# Patient Record
Sex: MALE | Race: WHITE
[De-identification: names, ages, dates, MRNs, and addresses within clinical notes are randomized per-mention and may not be internally consistent; named-entity substitution may affect disease eponyms.]

---

## 2017-07-26 ENCOUNTER — HOSPITAL ENCOUNTER (EMERGENCY)
Dept: HOSPITAL 25 - UCCORT | Age: 37
Discharge: HOME | End: 2017-07-26
Payer: COMMERCIAL

## 2017-07-26 VITALS — DIASTOLIC BLOOD PRESSURE: 80 MMHG | SYSTOLIC BLOOD PRESSURE: 125 MMHG

## 2017-07-26 DIAGNOSIS — F17.210: ICD-10-CM

## 2017-07-26 DIAGNOSIS — R50.9: Primary | ICD-10-CM

## 2017-07-26 LAB
ADD DIFF/SLIDE REVIEW?: (no result)
ALBUMIN SERPL BCG-MCNC: 4.2 G/DL (ref 3.2–5.2)
ALP SERPL-CCNC: 69 U/L (ref 34–104)
ALT SERPL W P-5'-P-CCNC: 19 U/L (ref 7–52)
ANION GAP SERPL CALC-SCNC: 6 MMOL/L (ref 2–11)
AST SERPL-CCNC: 18 U/L (ref 13–39)
BUN SERPL-MCNC: 10 MG/DL (ref 6–24)
BUN/CREAT SERPL: 11 (ref 8–20)
CALCIUM SERPL-MCNC: 9 MG/DL (ref 8.6–10.3)
CHLORIDE SERPL-SCNC: 102 MMOL/L (ref 101–111)
GLOBULIN SER CALC-MCNC: 2.8 G/DL (ref 2–4)
GLUCOSE SERPL-MCNC: 84 MG/DL (ref 70–100)
HCO3 SERPL-SCNC: 27 MMOL/L (ref 22–32)
HCT VFR BLD AUTO: 48 % (ref 42–52)
HGB BLD-MCNC: 15.8 G/DL (ref 14–18)
LIPASE SERPL-CCNC: 26 U/L (ref 11–82)
MCH RBC QN AUTO: 32 PG (ref 27–31)
MCHC RBC AUTO-ENTMCNC: 33 G/DL (ref 31–36)
MCV RBC AUTO: 97 FL (ref 80–94)
POTASSIUM SERPL-SCNC: 4.5 MMOL/L (ref 3.5–5)
PROT SERPL-MCNC: 7 G/DL (ref 6.4–8.9)
RBC # BLD AUTO: 4.98 10^6/UL (ref 4–5.4)
SODIUM SERPL-SCNC: 135 MMOL/L (ref 133–145)
WBC # BLD AUTO: 10.9 10^3/UL (ref 3.5–10.8)

## 2017-07-26 PROCEDURE — G0463 HOSPITAL OUTPT CLINIC VISIT: HCPCS

## 2017-07-26 PROCEDURE — 80053 COMPREHEN METABOLIC PANEL: CPT

## 2017-07-26 PROCEDURE — 36415 COLL VENOUS BLD VENIPUNCTURE: CPT

## 2017-07-26 PROCEDURE — 83690 ASSAY OF LIPASE: CPT

## 2017-07-26 PROCEDURE — 85025 COMPLETE CBC W/AUTO DIFF WBC: CPT

## 2017-07-26 PROCEDURE — 99211 OFF/OP EST MAY X REQ PHY/QHP: CPT

## 2017-07-26 NOTE — ED
HPI Febrile Illness





- HPI Summary


HPI Summary: 





37 YEAR OLD MALE PRESENTS WITH COMPLAINS OF ON GOING FEVER/CHILLS. 





- History of Current Complaint


Time Seen by Provider: 07/26/17 15:07





- Allergy/Home Medications


Allergies/Adverse Reactions: 


 Allergies











Allergy/AdvReac Type Severity Reaction Status Date / Time


 


No Known Allergies Allergy   Verified 07/26/17 15:10











Home Medications: 


 Home Medications





NK [No Home Medications Reported]  07/26/17 [History Confirmed 07/26/17]











PMH/Surg Hx/FS Hx/Imm Hx





- Social History


Alcohol Use: None


Substance Use Type: Reports: None


Smoking Status (MU): Heavy Every Day Tobacco Smoker


Type: Cigarettes


Amount Used/How Often: 2 PPD


Have You Smoked in the Last Year: Yes





Review of Systems


Positive: Fever, Chills


All Other Systems Reviewed And Are Negative: Yes





Physical Exam


Triage Information Reviewed: Yes





Diagnostics





- Laboratory


Result Diagrams: 


 07/26/17 15:30





 07/26/17 15:30


Lab Statement: Any lab studies that have been ordered have been reviewed, and 

results considered in the medical decision making process.





Course/Dx





- Diagnoses


Provider Diagnoses: 


 Fever








Discharge





- Discharge Plan


Condition: Stable


Disposition: HOME


Patient Education Materials:  Fever in Adults (ED)


Referrals: 


No Primary Care Phys,NOPCP [Primary Care Provider] - As Soon As Possible

## 2017-12-29 ENCOUNTER — HOSPITAL ENCOUNTER (EMERGENCY)
Dept: HOSPITAL 25 - UCCORT | Age: 37
Discharge: HOME | End: 2017-12-29
Payer: COMMERCIAL

## 2017-12-29 VITALS — SYSTOLIC BLOOD PRESSURE: 117 MMHG | DIASTOLIC BLOOD PRESSURE: 70 MMHG

## 2017-12-29 DIAGNOSIS — F17.210: ICD-10-CM

## 2017-12-29 DIAGNOSIS — J40: Primary | ICD-10-CM

## 2017-12-29 PROCEDURE — 99212 OFFICE O/P EST SF 10 MIN: CPT

## 2017-12-29 PROCEDURE — G0463 HOSPITAL OUTPT CLINIC VISIT: HCPCS

## 2017-12-29 NOTE — UC
Respiratory Complaint HPI





- HPI Summary


HPI Summary: 





3 weeks of cough and chest congestion no fever





- History of Current Complaint


Chief Complaint: UCRespiratory


Stated Complaint: CHEST CONGESTION,COUGH


Time Seen by Provider: 12/29/17 17:23


Hx Obtained From: Patient


Onset/Duration: Sudden Onset, Lasting Weeks - 3, Still Present


Timing: Constant


Severity Initially: Mild


Severity Currently: Mild


Character: Cough: Nonproductive


Aggravating Factors: Nothing


Alleviating Factors: Nothing


Associated Signs And Symptoms: Positive: URI





- Allergies/Home Medications


Allergies/Adverse Reactions: 


 Allergies











Allergy/AdvReac Type Severity Reaction Status Date / Time


 


No Known Allergies Allergy   Verified 12/29/17 17:19














PMH/Surg Hx/FS Hx/Imm Hx


Previously Healthy: Yes





- Surgical History


Surgical History: None





- Family History


Known Family History: Positive: None





- Social History


Occupation: Employed Full-time


Lives: With Family


Alcohol Use: None


Substance Use Type: None


Smoking Status (MU): Heavy Every Day Tobacco Smoker


Type: Cigarettes


Amount Used/How Often: 2 PPD


Have You Smoked in the Last Year: Yes


Household Exposure Type: Cigarettes


Cessation Counseling: Counseled 3+Min - 10 Min





- Immunization History


Most Recent Influenza Vaccination: no





Review of Systems


Constitutional: Negative


Skin: Negative


Eyes: Negative


ENT: Negative


Respiratory: Cough


Cardiovascular: Negative


Gastrointestinal: Negative


Genitourinary: Negative


Motor: Negative


Neurovascular: Negative


Musculoskeletal: Negative


Neurological: Negative


Psychological: Negative


Is Patient Immunocompromised?: No


All Other Systems Reviewed And Are Negative: Yes





Physical Exam


Triage Information Reviewed: Yes


Appearance: Well-Appearing, No Pain Distress, Well-Nourished


Vital Signs: 


 Initial Vital Signs











Temp  98.4 F   12/29/17 17:19


 


Pulse  90   12/29/17 17:19


 


Resp  20   12/29/17 17:19


 


BP  117/70   12/29/17 17:19


 


Pulse Ox  99   12/29/17 17:19











Vital Signs Reviewed: Yes


Eye Exam: Normal


Eyes: Positive: Conjunctiva Clear


ENT Exam: Normal


ENT: Positive: Normal ENT inspection, Hearing grossly normal, Pharynx normal, 

TMs normal, Uvula midline.  Negative: Nasal congestion, Nasal drainage, 

Tonsillar swelling, Tonsillar exudate, Trismus, Hoarse voice, Dental tenderness

, Sinus tenderness


Dental Exam: Normal


Neck exam: Normal


Neck: Positive: Supple, Nontender, No Lymphadenopathy


Respiratory Exam: Normal


Respiratory: Positive: Chest non-tender, Lungs clear, Normal breath sounds, No 

respiratory distress, No accessory muscle use


Cardiovascular Exam: Normal


Cardiovascular: Positive: RRR, No Murmur, Pulses Normal, Brisk Capillary Refill


Musculoskeletal Exam: Normal


Musculoskeletal: Positive: Strength Intact, ROM Intact, No Edema


Neurological Exam: Normal


Neurological: Positive: Alert, Muscle Tone Normal


Psychological Exam: Normal


Skin Exam: Normal





UC Diagnostic Evaluation





- Laboratory


O2 Sat by Pulse Oximetry: 99





Respiratory Course/Dx





- Course


Course Of Treatment: Zithromax, increase fluids, nicotine cesasation 

information follow with pcp





- Differential Dx/Diagnosis


Provider Diagnoses: Bronchitis, nicotine dependent





Discharge





- Discharge Plan


Condition: Stable


Disposition: HOME


Prescriptions: 


Azithromycin TAB* [Zithromax TAB (Z-DERIC) 250 mg #6 tabs] 2 tab PO .TODAY, THEN 

1 DAILY #1 deric


Patient Education Materials:  How to Stop Smoking (ED), Acute Bronchitis (ED)


Referrals: 


[facility] [place] PHYSICIAN REFERRAL [Outside] - 1 Week

## 2018-11-06 ENCOUNTER — HOSPITAL ENCOUNTER (EMERGENCY)
Dept: HOSPITAL 25 - UCCORT | Age: 38
Discharge: HOME | End: 2018-11-06
Payer: COMMERCIAL

## 2018-11-06 VITALS — DIASTOLIC BLOOD PRESSURE: 83 MMHG | SYSTOLIC BLOOD PRESSURE: 134 MMHG

## 2018-11-06 DIAGNOSIS — F17.210: ICD-10-CM

## 2018-11-06 DIAGNOSIS — S39.012A: Primary | ICD-10-CM
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
## 2018-11-06 PROCEDURE — 99212 OFFICE O/P EST SF 10 MIN: CPT

## 2018-11-06 PROCEDURE — G0463 HOSPITAL OUTPT CLINIC VISIT: HCPCS

## 2018-11-06 PROCEDURE — 72100 X-RAY EXAM L-S SPINE 2/3 VWS: CPT

## 2018-11-06 NOTE — UC
Back Pain HPI





- HPI Summary


HPI Summary: 





right lower back pain x 3 days


pain is sever, shooting pain , radiating to his right lower ext, 


+ tingling and numbness of right lower leg 


pain is worse with any movement , better with rest


pain started 3 days ago at work after a sneez 


no urinary sx 





- History of Current Complaint


Chief Complaint: UCBackPain


Stated Complaint: WC-LOWER BACK PAIN


Time Seen by Provider: 11/06/18 08:16


Hx Obtained From: Patient


Onset/Duration: Sudden Onset, Lasting Days - 3, Still Present


Timing: Constant


Severity Initially: Severe


Severity Currently: Severe


Pain Intensity: 10


Back Pain: Is Discrete @ - right lower back


Character: Sharp, Throbbing


Aggravating Factor(s): Movement, Lifting, Bending, Walking, Cough


Alleviating Factor(s): Rest


Associated Signs And Symptoms: Positive: Weakness, Numbness, Tingling.  Negative

: Swelling, Redness, Bruising, Fever, Abdominal Pain, Flank Pain, Bladder 

Incontinence, Bowel Incontinence, Weight Loss





- Allergies/Home Medications


Allergies/Adverse Reactions: 


 Allergies











Allergy/AdvReac Type Severity Reaction Status Date / Time


 


No Known Allergies Allergy   Verified 11/06/18 08:17











Home Medications: 


 Home Medications





Ibuprofen TAB* [Advil TAB*] 400 mg PO Q6H PRN 11/06/18 [History Confirmed 11/06/ 18]











PMH/Surg Hx/FS Hx/Imm Hx





- Additional Past Medical History


Additional PMH: 





hx of chronic back pain 





- Surgical History


Surgical History: None





- Family History


Known Family History: Positive: None


   Negative: Diabetes





- Social History


Alcohol Use: None


Substance Use Type: None


Smoking Status (MU): Heavy Every Day Tobacco Smoker


Type: Cigarettes


Amount Used/How Often: 1 1/2 PPD


Length of Time of Smoking/Using Tobacco: Since Age 9


Have You Smoked in the Last Year: Yes


Household Exposure Type: Cigarettes





- Immunization History


Most Recent Influenza Vaccination: no





Review of Systems


Constitutional: Negative


Skin: Negative


Eyes: Negative


ENT: Negative


Respiratory: Negative


Cardiovascular: Negative


Gastrointestinal: Negative


Is Patient Immunocompromised?: No


All Other Systems Reviewed And Are Negative: Yes





Physical Exam


Triage Information Reviewed: Yes


Appearance: Well-Appearing, No Pain Distress, Well-Nourished


Vital Signs: 


 Initial Vital Signs











Temp  97.6 F   11/06/18 08:15


 


Pulse  105   11/06/18 08:15


 


Resp  15   11/06/18 08:15


 


BP  134/83   11/06/18 08:15


 


Pulse Ox  100   11/06/18 08:15











Vital Signs Reviewed: Yes


Eyes: Positive: Conjunctiva Clear


ENT: Positive: Normal ENT inspection, Hearing grossly normal, Pharynx normal


Neck: Positive: Supple, Nontender, No Lymphadenopathy


Respiratory: Positive: Chest non-tender, Lungs clear, Normal breath sounds


Cardiovascular: Positive: RRR, No Murmur, Pulses Normal


Abdomen Description: Positive: Nontender, Soft, CVA Tenderness (R), CVA 

Tenderness (L), Distended, Guarding


Bowel Sounds: Positive: Present


Musculoskeletal: Positive: Other: - lower back: no swelling, no erythema, sever 

tenderness by light touch ,  limited ROM on flexion , DTR + 2 bilateral lower 

ext


Neurological: Positive: Alert, Muscle Tone Normal





Diagnostics





- Laboratory


Diagnostic Studies Completed/Ordered: xray lumbar spine: no fracture noted





Back Pain Course/Dx





- Differential Dx/Diagnosis


Provider Diagnoses: lower back strain





Discharge





- Sign-Out/Discharge


Documenting (check all that apply): Patient Departure


All imaging exams completed and their final reports reviewed: Yes





- Discharge Plan


Condition: Stable


Disposition: HOME


Prescriptions: 


Cyclobenzaprine TAB* [Flexeril 10 MG TAB*] 10 mg PO BID #20 tab


Naproxen [Naproxen 500 mg tab] 500 mg PO BID #20 tablet


Patient Education Materials:  Low Back Strain (ED)


Referrals: 


No Primary Care Phys,NOPCP [Primary Care Provider] - 7 Days





- Billing Disposition and Condition


Condition: STABLE


Disposition: Home